# Patient Record
Sex: MALE | Race: BLACK OR AFRICAN AMERICAN
[De-identification: names, ages, dates, MRNs, and addresses within clinical notes are randomized per-mention and may not be internally consistent; named-entity substitution may affect disease eponyms.]

---

## 2024-10-26 ENCOUNTER — HOSPITAL ENCOUNTER (EMERGENCY)
Facility: HOSPITAL | Age: 54
Discharge: ANOTHER HOSPITAL | End: 2024-10-26
Attending: EMERGENCY MEDICINE

## 2024-10-26 ENCOUNTER — APPOINTMENT (OUTPATIENT)
Facility: HOSPITAL | Age: 54
End: 2024-10-26

## 2024-10-26 VITALS
OXYGEN SATURATION: 100 % | WEIGHT: 154.32 LBS | RESPIRATION RATE: 27 BRPM | HEIGHT: 66 IN | SYSTOLIC BLOOD PRESSURE: 129 MMHG | BODY MASS INDEX: 24.8 KG/M2 | DIASTOLIC BLOOD PRESSURE: 76 MMHG | HEART RATE: 102 BPM | TEMPERATURE: 98.7 F

## 2024-10-26 DIAGNOSIS — R41.82 ALTERED MENTAL STATUS, UNSPECIFIED ALTERED MENTAL STATUS TYPE: ICD-10-CM

## 2024-10-26 DIAGNOSIS — G40.901 STATUS EPILEPTICUS (HCC): Primary | ICD-10-CM

## 2024-10-26 LAB
ALBUMIN SERPL-MCNC: 3.3 G/DL (ref 3.4–5)
ALBUMIN/GLOB SERPL: 0.8 (ref 0.8–1.7)
ALP SERPL-CCNC: 67 U/L (ref 45–117)
ALT SERPL-CCNC: 23 U/L (ref 16–61)
AMPHET UR QL SCN: NEGATIVE
ANION GAP BLD CALC-SCNC: ABNORMAL MMOL/L (ref 10–20)
ANION GAP BLD CALC-SCNC: ABNORMAL MMOL/L (ref 10–20)
ANION GAP SERPL CALC-SCNC: 5 MMOL/L (ref 3–18)
ANION GAP SERPL CALC-SCNC: 6 MMOL/L (ref 3–18)
APAP SERPL-MCNC: <2 UG/ML (ref 10–30)
APPEARANCE UR: CLEAR
ARTERIAL PATENCY WRIST A: POSITIVE
AST SERPL-CCNC: 12 U/L (ref 10–38)
BARBITURATES UR QL SCN: NEGATIVE
BASE EXCESS BLD CALC-SCNC: 0.3 MMOL/L
BASE EXCESS BLD CALC-SCNC: 1.2 MMOL/L
BASOPHILS # BLD: 0 K/UL (ref 0–0.1)
BASOPHILS NFR BLD: 0 % (ref 0–2)
BDY SITE: ABNORMAL
BDY SITE: ABNORMAL
BENZODIAZ UR QL: NEGATIVE
BILIRUB SERPL-MCNC: 0.4 MG/DL (ref 0.2–1)
BILIRUB UR QL: NEGATIVE
BODY TEMPERATURE: 98.9
BODY TEMPERATURE: 98.9
BUN SERPL-MCNC: 13 MG/DL (ref 7–18)
BUN SERPL-MCNC: 14 MG/DL (ref 7–18)
BUN/CREAT SERPL: 13 (ref 12–20)
BUN/CREAT SERPL: 14 (ref 12–20)
CA-I BLD-MCNC: 1.16 MMOL/L (ref 1.15–1.33)
CA-I BLD-MCNC: 1.19 MMOL/L (ref 1.15–1.33)
CALCIUM SERPL-MCNC: 8.4 MG/DL (ref 8.5–10.1)
CALCIUM SERPL-MCNC: 8.9 MG/DL (ref 8.5–10.1)
CANNABINOIDS UR QL SCN: NEGATIVE
CHLORIDE BLD-SCNC: 100 MMOL/L (ref 98–107)
CHLORIDE BLD-SCNC: 103 MMOL/L (ref 98–107)
CHLORIDE SERPL-SCNC: 106 MMOL/L (ref 100–111)
CHLORIDE SERPL-SCNC: 107 MMOL/L (ref 100–111)
CO2 BLD-SCNC: 24 MMOL/L (ref 22–29)
CO2 BLD-SCNC: 25 MMOL/L (ref 22–29)
CO2 SERPL-SCNC: 23 MMOL/L (ref 21–32)
CO2 SERPL-SCNC: 25 MMOL/L (ref 21–32)
COCAINE UR QL SCN: NEGATIVE
COLOR UR: YELLOW
CREAT BLD-MCNC: 0.86 MG/DL (ref 0.6–1.3)
CREAT BLD-MCNC: 1.11 MG/DL (ref 0.6–1.3)
CREAT SERPL-MCNC: 0.96 MG/DL (ref 0.6–1.3)
CREAT SERPL-MCNC: 1.12 MG/DL (ref 0.6–1.3)
DIFFERENTIAL METHOD BLD: ABNORMAL
EKG ATRIAL RATE: 110 BPM
EKG DIAGNOSIS: NORMAL
EKG P AXIS: 67 DEGREES
EKG P-R INTERVAL: 168 MS
EKG Q-T INTERVAL: 314 MS
EKG QRS DURATION: 74 MS
EKG QTC CALCULATION (BAZETT): 424 MS
EKG R AXIS: 76 DEGREES
EKG T AXIS: 59 DEGREES
EKG VENTRICULAR RATE: 110 BPM
EOSINOPHIL # BLD: 0 K/UL (ref 0–0.4)
EOSINOPHIL NFR BLD: 0 % (ref 0–5)
ERYTHROCYTE [DISTWIDTH] IN BLOOD BY AUTOMATED COUNT: 13.8 % (ref 11.6–14.5)
ETHANOL SERPL-MCNC: <3 MG/DL (ref 0–3)
FENTANYL: NEGATIVE
FIO2 ON VENT: 100 %
FIO2 ON VENT: 60 %
FLUAV RNA SPEC QL NAA+PROBE: NOT DETECTED
FLUBV RNA SPEC QL NAA+PROBE: NOT DETECTED
GAS FLOW.O2 O2 DELIVERY SYS: ABNORMAL
GAS FLOW.O2 O2 DELIVERY SYS: ABNORMAL
GLOBULIN SER CALC-MCNC: 3.9 G/DL (ref 2–4)
GLUCOSE BLD-MCNC: 113 MG/DL (ref 74–99)
GLUCOSE BLD-MCNC: 97 MG/DL (ref 74–99)
GLUCOSE SERPL-MCNC: 187 MG/DL (ref 74–99)
GLUCOSE SERPL-MCNC: 99 MG/DL (ref 74–99)
GLUCOSE UR STRIP.AUTO-MCNC: 100 MG/DL
HCO3 BLD-SCNC: 24.2 MMOL/L (ref 21–28)
HCO3 BLD-SCNC: 24.7 MMOL/L (ref 21–28)
HCT VFR BLD AUTO: 36.9 % (ref 36–48)
HGB BLD-MCNC: 12.7 G/DL (ref 13–16)
HGB UR QL STRIP: NEGATIVE
IMM GRANULOCYTES # BLD AUTO: 0 K/UL (ref 0–0.04)
IMM GRANULOCYTES NFR BLD AUTO: 0 % (ref 0–0.5)
INSPIRATION.DURATION SETTING TIME VENT: 0.7 SEC
INSPIRATION.DURATION SETTING TIME VENT: 0.9 SEC
IPAP/PIP: 18
IPAP/PIP: 23
KETONES UR QL STRIP.AUTO: NEGATIVE MG/DL
LACTATE BLD-SCNC: 1.38 MMOL/L (ref 0.4–2)
LACTATE BLD-SCNC: 1.61 MMOL/L (ref 0.4–2)
LEUKOCYTE ESTERASE UR QL STRIP.AUTO: NEGATIVE
LITHIUM SERPL-SCNC: <0.2 MMOL/L (ref 0.6–1.2)
LYMPHOCYTES # BLD: 0.5 K/UL (ref 0.9–3.6)
LYMPHOCYTES NFR BLD: 8 % (ref 21–52)
Lab: NORMAL
Lab: NORMAL
MAGNESIUM SERPL-MCNC: 1.7 MG/DL (ref 1.6–2.6)
MCH RBC QN AUTO: 29.5 PG (ref 24–34)
MCHC RBC AUTO-ENTMCNC: 34.4 G/DL (ref 31–37)
MCV RBC AUTO: 85.6 FL (ref 78–100)
METHADONE UR QL: NEGATIVE
MONOCYTES # BLD: 0.8 K/UL (ref 0.05–1.2)
MONOCYTES NFR BLD: 12 % (ref 3–10)
NEUTS SEG # BLD: 5.2 K/UL (ref 1.8–8)
NEUTS SEG NFR BLD: 80 % (ref 40–73)
NITRITE UR QL STRIP.AUTO: NEGATIVE
NRBC # BLD: 0 K/UL (ref 0–0.01)
NRBC BLD-RTO: 0 PER 100 WBC
OPIATES UR QL: NEGATIVE
OXYCODONE UR QL SCN: NEGATIVE
PAW @ MEAN EXP FLOW ON VENT: 8.8 CMH2O
PAW @ MEAN EXP FLOW ON VENT: 9.4 CMH2O
PCO2 BLD: 34.9 MMHG (ref 35–48)
PCO2 BLD: 35.9 MMHG (ref 35–48)
PCP UR QL: NEGATIVE
PEEP RESPIRATORY: 6
PEEP RESPIRATORY: 6
PH BLD: 7.44 (ref 7.35–7.45)
PH BLD: 7.46 (ref 7.35–7.45)
PH UR STRIP: 7 (ref 5–8)
PHOSPHATE SERPL-MCNC: 3.1 MG/DL (ref 2.5–4.9)
PLATELET # BLD AUTO: 229 K/UL (ref 135–420)
PMV BLD AUTO: 9.3 FL (ref 9.2–11.8)
PO2 BLD: 216 MMHG (ref 83–108)
PO2 BLD: 606 MMHG (ref 83–108)
POTASSIUM BLD-SCNC: 3.9 MMOL/L (ref 3.5–5.1)
POTASSIUM BLD-SCNC: 4 MMOL/L (ref 3.5–5.1)
POTASSIUM SERPL-SCNC: 4.2 MMOL/L (ref 3.5–5.5)
POTASSIUM SERPL-SCNC: 4.7 MMOL/L (ref 3.5–5.5)
PROT SERPL-MCNC: 7.2 G/DL (ref 6.4–8.2)
PROT UR STRIP-MCNC: NEGATIVE MG/DL
RBC # BLD AUTO: 4.31 M/UL (ref 4.35–5.65)
RESPIRATORY RATE, POC: 12 (ref 5–40)
RESPIRATORY RATE, POC: 16 (ref 5–40)
SALICYLATES SERPL-MCNC: <1.7 MG/DL (ref 2.8–20)
SAO2 % BLD: 100 % (ref 94–98)
SAO2 % BLD: 100 % (ref 94–98)
SARS-COV-2 RNA RESP QL NAA+PROBE: NOT DETECTED
SERVICE CMNT-IMP: ABNORMAL
SERVICE CMNT-IMP: ABNORMAL
SODIUM BLD-SCNC: 138 MMOL/L (ref 136–145)
SODIUM BLD-SCNC: 140 MMOL/L (ref 136–145)
SODIUM SERPL-SCNC: 136 MMOL/L (ref 136–145)
SODIUM SERPL-SCNC: 136 MMOL/L (ref 136–145)
SOURCE: NORMAL
SP GR UR REFRACTOMETRY: >1.03 (ref 1–1.03)
SPECIMEN SITE: ABNORMAL
SPECIMEN SITE: ABNORMAL
TROPONIN I SERPL HS-MCNC: 8 NG/L (ref 0–78)
UROBILINOGEN UR QL STRIP.AUTO: 1 EU/DL (ref 0.2–1)
VALPROATE SERPL-MCNC: 37 UG/ML (ref 50–100)
VENTILATION MODE VENT: ABNORMAL
VENTILATION MODE VENT: ABNORMAL
VT SETTING VENT: 500
VT SETTING VENT: 520
WBC # BLD AUTO: 6.5 K/UL (ref 4.6–13.2)

## 2024-10-26 PROCEDURE — 2720000010 HC SURG SUPPLY STERILE

## 2024-10-26 PROCEDURE — 2500000003 HC RX 250 WO HCPCS: Performed by: PSYCHIATRY & NEUROLOGY

## 2024-10-26 PROCEDURE — 71045 X-RAY EXAM CHEST 1 VIEW: CPT

## 2024-10-26 PROCEDURE — 82947 ASSAY GLUCOSE BLOOD QUANT: CPT

## 2024-10-26 PROCEDURE — 85025 COMPLETE CBC W/AUTO DIFF WBC: CPT

## 2024-10-26 PROCEDURE — 70498 CT ANGIOGRAPHY NECK: CPT

## 2024-10-26 PROCEDURE — 99291 CRITICAL CARE FIRST HOUR: CPT | Performed by: PSYCHIATRY & NEUROLOGY

## 2024-10-26 PROCEDURE — 84295 ASSAY OF SERUM SODIUM: CPT

## 2024-10-26 PROCEDURE — 84132 ASSAY OF SERUM POTASSIUM: CPT

## 2024-10-26 PROCEDURE — 96365 THER/PROPH/DIAG IV INF INIT: CPT

## 2024-10-26 PROCEDURE — 31500 INSERT EMERGENCY AIRWAY: CPT

## 2024-10-26 PROCEDURE — 99291 CRITICAL CARE FIRST HOUR: CPT

## 2024-10-26 PROCEDURE — 94761 N-INVAS EAR/PLS OXIMETRY MLT: CPT

## 2024-10-26 PROCEDURE — 96375 TX/PRO/DX INJ NEW DRUG ADDON: CPT

## 2024-10-26 PROCEDURE — 84484 ASSAY OF TROPONIN QUANT: CPT

## 2024-10-26 PROCEDURE — 99292 CRITICAL CARE ADDL 30 MIN: CPT | Performed by: PSYCHIATRY & NEUROLOGY

## 2024-10-26 PROCEDURE — 80307 DRUG TEST PRSMV CHEM ANLYZR: CPT

## 2024-10-26 PROCEDURE — 99285 EMERGENCY DEPT VISIT HI MDM: CPT

## 2024-10-26 PROCEDURE — 84100 ASSAY OF PHOSPHORUS: CPT

## 2024-10-26 PROCEDURE — 96360 HYDRATION IV INFUSION INIT: CPT

## 2024-10-26 PROCEDURE — 80164 ASSAY DIPROPYLACETIC ACD TOT: CPT

## 2024-10-26 PROCEDURE — 80178 ASSAY OF LITHIUM: CPT

## 2024-10-26 PROCEDURE — 80179 DRUG ASSAY SALICYLATE: CPT

## 2024-10-26 PROCEDURE — 83605 ASSAY OF LACTIC ACID: CPT

## 2024-10-26 PROCEDURE — 2700000000 HC OXYGEN THERAPY PER DAY

## 2024-10-26 PROCEDURE — 6360000002 HC RX W HCPCS

## 2024-10-26 PROCEDURE — 82077 ASSAY SPEC XCP UR&BREATH IA: CPT

## 2024-10-26 PROCEDURE — 82330 ASSAY OF CALCIUM: CPT

## 2024-10-26 PROCEDURE — 2500000003 HC RX 250 WO HCPCS: Performed by: EMERGENCY MEDICINE

## 2024-10-26 PROCEDURE — 96376 TX/PRO/DX INJ SAME DRUG ADON: CPT

## 2024-10-26 PROCEDURE — 80143 DRUG ASSAY ACETAMINOPHEN: CPT

## 2024-10-26 PROCEDURE — 99292 CRITICAL CARE ADDL 30 MIN: CPT

## 2024-10-26 PROCEDURE — 96361 HYDRATE IV INFUSION ADD-ON: CPT

## 2024-10-26 PROCEDURE — 93010 ELECTROCARDIOGRAM REPORT: CPT | Performed by: INTERNAL MEDICINE

## 2024-10-26 PROCEDURE — 83735 ASSAY OF MAGNESIUM: CPT

## 2024-10-26 PROCEDURE — 85014 HEMATOCRIT: CPT

## 2024-10-26 PROCEDURE — 70450 CT HEAD/BRAIN W/O DYE: CPT

## 2024-10-26 PROCEDURE — 6360000002 HC RX W HCPCS: Performed by: EMERGENCY MEDICINE

## 2024-10-26 PROCEDURE — 87636 SARSCOV2 & INF A&B AMP PRB: CPT

## 2024-10-26 PROCEDURE — 82803 BLOOD GASES ANY COMBINATION: CPT

## 2024-10-26 PROCEDURE — 81003 URINALYSIS AUTO W/O SCOPE: CPT

## 2024-10-26 PROCEDURE — 94002 VENT MGMT INPAT INIT DAY: CPT

## 2024-10-26 PROCEDURE — 80053 COMPREHEN METABOLIC PANEL: CPT

## 2024-10-26 PROCEDURE — 95706 EEG WO VID 2-12HR INTMT MNTR: CPT

## 2024-10-26 PROCEDURE — 36600 WITHDRAWAL OF ARTERIAL BLOOD: CPT

## 2024-10-26 PROCEDURE — 6360000004 HC RX CONTRAST MEDICATION: Performed by: EMERGENCY MEDICINE

## 2024-10-26 PROCEDURE — 93005 ELECTROCARDIOGRAM TRACING: CPT | Performed by: EMERGENCY MEDICINE

## 2024-10-26 PROCEDURE — 2580000003 HC RX 258: Performed by: EMERGENCY MEDICINE

## 2024-10-26 PROCEDURE — 2580000003 HC RX 258: Performed by: PSYCHIATRY & NEUROLOGY

## 2024-10-26 PROCEDURE — 95717 EEG PHYS/QHP 2-12 HR W/O VID: CPT | Performed by: PSYCHIATRY & NEUROLOGY

## 2024-10-26 RX ORDER — ETOMIDATE 2 MG/ML
20 INJECTION INTRAVENOUS ONCE
Status: COMPLETED | OUTPATIENT
Start: 2024-10-26 | End: 2024-10-26

## 2024-10-26 RX ORDER — LEVETIRACETAM 500 MG/1
500 TABLET ORAL 2 TIMES DAILY
COMMUNITY

## 2024-10-26 RX ORDER — LORAZEPAM 2 MG/ML
2 INJECTION INTRAMUSCULAR ONCE
Status: COMPLETED | OUTPATIENT
Start: 2024-10-26 | End: 2024-10-26

## 2024-10-26 RX ORDER — BENZTROPINE MESYLATE 0.5 MG/1
0.5 TABLET ORAL
COMMUNITY

## 2024-10-26 RX ORDER — PROPOFOL 10 MG/ML
INJECTION, EMULSION INTRAVENOUS
Status: COMPLETED
Start: 2024-10-26 | End: 2024-10-26

## 2024-10-26 RX ORDER — LORAZEPAM 2 MG/ML
INJECTION INTRAMUSCULAR
Status: COMPLETED
Start: 2024-10-26 | End: 2024-10-26

## 2024-10-26 RX ORDER — PROPOFOL 10 MG/ML
5-50 INJECTION, EMULSION INTRAVENOUS ONCE
Status: COMPLETED | OUTPATIENT
Start: 2024-10-26 | End: 2024-10-26

## 2024-10-26 RX ORDER — SODIUM CHLORIDE 9 MG/ML
INJECTION, SOLUTION INTRAVENOUS CONTINUOUS
Status: DISCONTINUED | OUTPATIENT
Start: 2024-10-26 | End: 2024-10-26 | Stop reason: HOSPADM

## 2024-10-26 RX ORDER — BICTEGRAVIR SODIUM, EMTRICITABINE, AND TENOFOVIR ALAFENAMIDE FUMARATE 50; 200; 25 MG/1; MG/1; MG/1
1 TABLET ORAL DAILY
COMMUNITY
Start: 2024-09-27

## 2024-10-26 RX ORDER — LEVETIRACETAM 500 MG/5ML
3000 INJECTION, SOLUTION, CONCENTRATE INTRAVENOUS
Status: COMPLETED | OUTPATIENT
Start: 2024-10-26 | End: 2024-10-26

## 2024-10-26 RX ORDER — MIDAZOLAM HYDROCHLORIDE 1 MG/ML
5 INJECTION, SOLUTION INTRAMUSCULAR; INTRAVENOUS ONCE
Status: DISCONTINUED | OUTPATIENT
Start: 2024-10-26 | End: 2024-10-26 | Stop reason: HOSPADM

## 2024-10-26 RX ORDER — HALOPERIDOL 5 MG/1
10 TABLET ORAL DAILY
COMMUNITY
Start: 2024-10-09 | End: 2024-11-08

## 2024-10-26 RX ORDER — PROPOFOL 10 MG/ML
5-50 INJECTION, EMULSION INTRAVENOUS CONTINUOUS
Status: DISCONTINUED | OUTPATIENT
Start: 2024-10-26 | End: 2024-10-26 | Stop reason: HOSPADM

## 2024-10-26 RX ORDER — EMTRICITABINE AND TENOFOVIR DISOPROXIL FUMARATE 200; 300 MG/1; MG/1
1 TABLET, FILM COATED ORAL DAILY
COMMUNITY

## 2024-10-26 RX ORDER — ROCURONIUM BROMIDE 10 MG/ML
80 INJECTION, SOLUTION INTRAVENOUS ONCE
Status: COMPLETED | OUTPATIENT
Start: 2024-10-26 | End: 2024-10-26

## 2024-10-26 RX ORDER — DIVALPROEX SODIUM 500 MG/1
500 TABLET, DELAYED RELEASE ORAL 2 TIMES DAILY
COMMUNITY
Start: 2024-10-09 | End: 2024-11-08

## 2024-10-26 RX ORDER — IOPAMIDOL 755 MG/ML
85 INJECTION, SOLUTION INTRAVASCULAR
Status: COMPLETED | OUTPATIENT
Start: 2024-10-26 | End: 2024-10-26

## 2024-10-26 RX ORDER — LEVETIRACETAM 500 MG/5ML
1500 INJECTION, SOLUTION, CONCENTRATE INTRAVENOUS
Status: DISCONTINUED | OUTPATIENT
Start: 2024-10-26 | End: 2024-10-26

## 2024-10-26 RX ORDER — 0.9 % SODIUM CHLORIDE 0.9 %
1000 INTRAVENOUS SOLUTION INTRAVENOUS ONCE
Status: COMPLETED | OUTPATIENT
Start: 2024-10-26 | End: 2024-10-26

## 2024-10-26 RX ADMIN — PROPOFOL 20 MCG/KG/MIN: 10 INJECTION, EMULSION INTRAVENOUS at 15:50

## 2024-10-26 RX ADMIN — LEVETIRACETAM 3000 MG: 500 INJECTION, SOLUTION, CONCENTRATE INTRAVENOUS at 14:44

## 2024-10-26 RX ADMIN — ROCURONIUM BROMIDE 80 MG: 10 INJECTION, SOLUTION INTRAVENOUS at 14:51

## 2024-10-26 RX ADMIN — LORAZEPAM 2 MG: 2 INJECTION INTRAMUSCULAR at 14:10

## 2024-10-26 RX ADMIN — PROPOFOL 20 MCG/KG/MIN: 10 INJECTION, EMULSION INTRAVENOUS at 15:00

## 2024-10-26 RX ADMIN — SODIUM CHLORIDE 1000 ML: 9 INJECTION, SOLUTION INTRAVENOUS at 15:48

## 2024-10-26 RX ADMIN — ETOMIDATE INJECTION 20 MG: 2 SOLUTION INTRAVENOUS at 14:51

## 2024-10-26 RX ADMIN — LORAZEPAM 2 MG: 2 INJECTION INTRAMUSCULAR; INTRAVENOUS at 14:35

## 2024-10-26 RX ADMIN — SODIUM CHLORIDE: 9 INJECTION, SOLUTION INTRAVENOUS at 15:51

## 2024-10-26 RX ADMIN — VALPROATE SODIUM 2000 MG: 100 INJECTION, SOLUTION INTRAVENOUS at 17:38

## 2024-10-26 RX ADMIN — PROPOFOL 50 MCG/KG/MIN: 10 INJECTION, EMULSION INTRAVENOUS at 19:56

## 2024-10-26 RX ADMIN — SODIUM CHLORIDE: 9 INJECTION, SOLUTION INTRAVENOUS at 15:48

## 2024-10-26 RX ADMIN — LORAZEPAM 2 MG: 2 INJECTION INTRAMUSCULAR; INTRAVENOUS at 14:10

## 2024-10-26 RX ADMIN — LORAZEPAM 2 MG: 2 INJECTION INTRAMUSCULAR; INTRAVENOUS at 14:30

## 2024-10-26 RX ADMIN — IOPAMIDOL 85 ML: 755 INJECTION, SOLUTION INTRAVENOUS at 16:52

## 2024-10-26 ASSESSMENT — ENCOUNTER SYMPTOMS
EYES NEGATIVE: 1
ABDOMINAL PAIN: 0
CHEST TIGHTNESS: 0

## 2024-10-26 ASSESSMENT — PAIN - FUNCTIONAL ASSESSMENT: PAIN_FUNCTIONAL_ASSESSMENT: NONE - DENIES PAIN

## 2024-10-26 ASSESSMENT — PULMONARY FUNCTION TESTS
PIF_VALUE: 22
PIF_VALUE: 18
PIF_VALUE: 20
PIF_VALUE: 18

## 2024-10-26 NOTE — PROGRESS NOTES
Titrate FIO2 50%/ABG results.   10/26/24 1751   Breath Sounds   Respiratory Pattern Regular   Breath Sounds Bilateral Clear   Vent Information   Ventilator Day(s) 1   Vent Mode AC/PRVC   Ventilator Settings   FiO2  50 %   Insp Time (sec) 0.7 sec   Resp Rate (Set) 12 bpm   Target Vt 500   PEEP/CPAP (cmH2O) 6   Vent Patient Data (Readings)   Vt (Measured) 531 mL   Peak Inspiratory Pressure (cmH2O) 22 cmH2O   Rate Measured 12 br/min   Minute Volume (L/min) 6.23 Liters   Mean Airway Pressure (cmH2O) 8.8 cmH20   Plateau Pressure (cm H2O) 16 cm H2O   Driving Pressure 10   I:E Ratio 1:6.1   Static Compliance (L/cm H2O) 55   Insp Rise Time (%) 50 %   Vent Alarm Settings   High Pressure (cmH2O) 40 cmH2O   Low Minute Volume (lpm) 2 L/min   High Minute Volume (lpm) 20 L/min   Low Exhaled Vt (ml) 200 mL   RR High (bpm) 40 br/min   Apnea (secs) 20 secs   Additional Respiratoray Assessments   Humidification Source Heated wire   Humidification Temp 37   Ambu Bag With Mask At Bedside Yes   Airway Clearance   Suction ET Tube   Suction Device Inline suction catheter   Sputum Method Obtained Endotracheal   Sputum Amount Moderate   Sputum Color/Odor Clear;Tan   Sputum Consistency Thick;Thin   ETT    Placement Date/Time: 10/26/24 1452   Present on Admission/Arrival: No  Placed By: In ED;Licensed provider  Placement Verified By: Auscultation;Colorimetric ETCO2 device;Direct visualization  Preoxygenation: Yes  Mask Ventilation: Ventilated by mask (1...   Secured At 26 cm   Measured From Lips   ETT Placement Left   Secured By Commercial tube gamez   Site Assessment Dry

## 2024-10-26 NOTE — VIRTUAL HEALTH
LCSW attempted to meet with pt.  Pt was eating food, refused to look at camera, and laid down to cover himself with blanket.   Pt would close his eyes, and look over at camera.   LCSW attempted for several minutes to get patients attention.  RN attempted to get pt to speak to LCSW.   Pt eat food, did not make eye contact, laid back in his bed, and did not make eye contact.     RN reported that she would express refusal to ED provider.     Reason for Cancel: TelePsych Reason for Cancel: Patient refused       Electronically signed by Grant Luna LCSW on 10/26/2024 at 12:54 PM.     Keith Samson, was evaluated through a synchronous (real-time) audio-video encounter. The patient (and/or guardian if applicable) is aware that this is a billable service, which includes applicable co-pays. This virtual visit was conducted with patient's (and/or legal guardian's) consent. Patient identification was verified, and a caregiver was present when appropriate.  The patient was located at Facility (Appt Department): [facility] EMERGENCY DEPT  [address] [address]  Loc: [phone]  The provider was located at Home (City/State): [place], [place]  Confirm you are appropriately licensed, registered, or certified to deliver care in the state where the patient is located as indicated above. If you are not or unsure, please re-schedule the visit: No, this visit will be rescheduled.   [place] Consult to Tele-Psych  Consult performed by: Grant Luna LCSW  Consult ordered by: Contreras Jacinto MD         Total time spent on this encounter: Not billed by time    --Grant Luna LCSW on 10/26/2024 at 12:53 PM    An electronic signature was used to authenticate this note.

## 2024-10-26 NOTE — ED NOTES
Assumed care of pt after pt exhibited seizure-like activity, persistent despite 3 doses of 2mg ativan IV and 3g of keppra IV.     Pt intubated for airway protection. Received etomidate 20mg and viridiana 80mg IV at 1451, pt successfully intubated at 1452 with ETT 26 at the teeth.

## 2024-10-26 NOTE — ED NOTES
Pt has been spoken to multiple times, by multiple staff members, instructed to participate in telepsych interview. Patient is either unable to or unwilling to cooperate or partictipate in his care. Patient refuses to respond to staff members, safety partner stated that patient 'wakes up' when there is mention of food but flat affect observed otherwise.      is bedside and updated on the patient's behavior. She stated that the patient is in a group home and in charge of his own medication. Patient has an upcoming appointment with his mental health provider per the .

## 2024-10-26 NOTE — VIRTUAL HEALTH
Consults     Reason for Cancel: TelePsych Reason for Cancel: Patient unable to participate      Called and spoke with Dr. Jacinto regarding patient.  During conversation, Dr. Jacinto had to urgently go to patient's bedside and I was informed that the patient had a seizure.  The consult order will be cancelled as ED will be attending to patient's medical needs.  Please re-consult, when appropriate, so TelePsych team can evaluate patient.  Thank you.        --Octavio Malloy, ADIS - CNP on 10/26/2024 at 2:20 PM    An electronic signature was used to authenticate this note.

## 2024-10-26 NOTE — PROGRESS NOTES
Preliminary interpretation CTA head neck.    No large vessel occlusion.    Study mildly motion degraded.    Full report to follow.

## 2024-10-26 NOTE — PROGRESS NOTES
Intubated and initiated Mechanical for airway protection, unresponsiveness and continuing seizures.   10/26/24 1515   Breath Sounds   Respiratory Pattern Regular   Breath Sounds Bilateral Clear   Vent Information   Ventilator Day(s) 1   Ventilator Initiate Yes   Vent Mode AC/PRVC   Ventilator Settings   FiO2  100 %   Insp Time (sec) 0.9 sec   Resp Rate (Set) 16 bpm   Target Vt 520   PEEP/CPAP (cmH2O) 6   Vent Patient Data (Readings)   Vt (Measured) 480 mL   Peak Inspiratory Pressure (cmH2O) 18 cmH2O   Rate Measured 16 br/min   Minute Volume (L/min) 7.77 Liters   Mean Airway Pressure (cmH2O) 9.4 cmH20   Plateau Pressure (cm H2O) 17 cm H2O   Driving Pressure 11   I:E Ratio 1:3.2   Static Compliance (L/cm H2O) 72   Insp Rise Time (%) 50 %   Backup Apnea On   Backup Rate 16 Breaths Per Minute   Backup Vt 520   Backup I Time 1   Vent Alarm Settings   High Pressure (cmH2O) 40 cmH2O   Low Minute Volume (lpm) 2 L/min   High Minute Volume (lpm) 20 L/min   Low Exhaled Vt (ml) 200 mL   RR High (bpm) 40 br/min   Apnea (secs) 20 secs   Additional Respiratoray Assessments   Humidification Source Heated wire   Humidification Temp 37   Ambu Bag With Mask At Bedside Yes   ETT    Placement Date/Time: 10/26/24 1452   Present on Admission/Arrival: No  Placed By: In ED;Licensed provider  Placement Verified By: Auscultation;Colorimetric ETCO2 device;Direct visualization  Preoxygenation: Yes  Mask Ventilation: Ventilated by mask (1...   $ Intubation Emergent  $ Yes   Secured At 26 cm   Measured From Lips   ETT Placement Center   Secured By Commercial tube gamez   Site Assessment Dry

## 2024-10-26 NOTE — VIRTUAL HEALTH
[place] Consult to Tele-Psych  Consult performed by: Rosalina Pineda APRN - CNP  Consult ordered by: Contreras Jacinto MD  Reason for consult: Homicidal/Risk to Others      Reason for Cancel: TelePsych Reason for Cancel: Patient refused    Patient is refusing to participate in consult at this time. ED staff in room made multiple attempts to encourage patient to participate, however, patient refused to answer any questions. Spoke to Dr. Jacinto and advised consult will be cancelled at this time and to place new order for Telepsych consult when patient is alert and agreeable to participate in assessment.          Keith Samson, was evaluated through a synchronous (real-time) audio-video encounter. The patient (and/or guardian if applicable) is aware that this is a billable service, which includes applicable co-pays. This virtual visit was conducted with patient's (and/or legal guardian's) consent. Patient identification was verified, and a caregiver was present when appropriate.  The patient was located at Facility (Appt Department): [facility] EMERGENCY DEPT  [address] [address]  Loc: [phone]  The provider was located at Home (City/State): [place]  Confirm you are appropriately licensed, registered, or certified to deliver care in the state where the patient is located as indicated above. If you are not or unsure, please re-schedule the visit: Yes, I confirm.   [place] Consult to Tele-Psych  Consult performed by: Rosalina Pineda APRN - CNP  Consult ordered by: Contreras Jacinto MD  Reason for consult: Homicidal/Risk to Others      Total time spent on this encounter: Not billed by time    --ADIS Sprague CNP on 10/26/2024 at 11:12 AM    An electronic signature was used to authenticate this note.

## 2024-10-26 NOTE — PROGRESS NOTES
Titrate FIO2 60%, RR 12/ABG results...   10/26/24 1536   Patient Observation   SpO2 100 %   Breath Sounds   Respiratory Pattern Regular   Breath Sounds Bilateral Clear   Vent Information   Ventilator Day(s) 1   Vent Mode AC/PRVC   Ventilator Settings   FiO2  60 %   Insp Time (sec) 0.9 sec   Resp Rate (Set) 12 bpm   Target Vt 500   PEEP/CPAP (cmH2O) 6   Vent Patient Data (Readings)   Vt (Measured) 504 mL   Peak Inspiratory Pressure (cmH2O) 18 cmH2O   Rate Measured 14 br/min   Minute Volume (L/min) 6.94 Liters   Mean Airway Pressure (cmH2O) 10 cmH20   I:E Ratio 1:4.2

## 2024-10-26 NOTE — ED NOTES
Assumed care of PT. Cardiac monitor in place.  Pt intubated. Medications running as per MAR and verified.

## 2024-10-26 NOTE — CONSULTS
[facility]  Department of Neurology  Chief Complaint   Patient presents with    Suicidal       HPI:   54 M h/o HIV was sent to hospital as he was found down at floor. He stated that he abused drug, as he had suicidal ideation. After he was sent to ER, patient suddenly had multiple episodes of seizure characteristic of unresponsiveness, whole body jerking, eyes rolling back, and deviation to rightward. There was right arm/elbow stiff extension These episodes happened without full recovery. He was intubated for airway protection, and loaded with propofol    No past medical history on file.    No past surgical history on file.    Current Facility-Administered Medications   Medication Dose Route Frequency Provider Last Rate Last Admin    sodium chloride 0.9 % bolus 1,000 mL  1,000 mL IntraVENous Once Contreras Jacinto .6 mL/hr at 10/26/24 1548 1,000 mL at 10/26/24 1548    0.9 % sodium chloride infusion   IntraVENous Continuous Contreras Jacinto  mL/hr at 10/26/24 1556 Rate Verify at 10/26/24 1556    propofol infusion  5-50 mcg/kg/min (Order-Specific) IntraVENous Continuous Contreras Jacinto MD        rocuronium (ZEMURON) injection 80 mg  80 mg IntraVENous Once Contreras Jacinto MD         No current outpatient medications on file.        Allergies   Allergen Reactions    Penicillins             No family history on file.    Review of Systems:  unobtainable    Physical Examination:  BP (!) 132/119   Pulse (!) 115   Temp 98.9 °F (37.2 °C) (Axillary)   Resp 23   Ht 1.676 m (5' 6\")   Wt 70 kg (154 lb 5.2 oz)   SpO2 100%   BMI 24.91 kg/m²     GENERAL APPEARANCE:  The patient's intubated and sedated with propofol.  EYES:  Anicteric.   NECK:  Supple.  CARDIOVASCULAR:  Pulses are present bilaterally.  LUNGS:  No respiratory distress.    EXTREMITIES: No edema.    NEUROLOGICAL EXAM:  Mental status:  The patient does not response to verbal commands      Cranial nerves:     2:  Pupils  periventricular small vessel disease change..    Total neurocritical time 75 minutes taking care of patient in following activities:  1-Preparing to see the patient (review of tests, prior medical visits)  2-Obtaining and/or reviewing separately medically appropriate obtained history  3-Performing the medically appropriate exam and/or evaluation  4-Clinical documentation in the EHR or other health record  5-Counseling and educating the patient or caregiver  6-Interpreting results and/or communicating results to the patient/family/caregiver  7-Care coordination  8-Ordering medications, tests, or procedures  9-Referring and communicating with other health care professionals  10-Documentation in EHR.     Signed By: Marivel Keller MD. PhD, MS, FAASM

## 2024-10-26 NOTE — ED TRIAGE NOTES
Pt brought in from home. Pt shaking and stated her used drugs tonight but did not state exactly what drugs he used. Pt not answerig questions. Pt had feces on clothes and shoes. Removed clothes, placed in blue scrubs.

## 2024-10-27 PROBLEM — G40.901 STATUS EPILEPTICUS (HCC): Status: ACTIVE | Noted: 2024-10-27

## 2024-10-27 NOTE — PROGRESS NOTES
55 Y/o male full code allergic: penicillin  Arrived 10/26/24 c/o homicidal ideation per EMS    History: schizoaffective disorder, HIV , smokes mariajuana, cocaine and cigarette

## 2024-10-27 NOTE — PROCEDURES
PROCEDURE NOTE  Date: 10/27/2024   Name: Keith Samson  YOB: 1970  Date of service: 10/26/2024  ProceduresEEG Procedure Note  Indications: Seizure    Medications:   No current facility-administered medications for this encounter.     Current Outpatient Medications   Medication Sig Dispense Refill    bictegravir-emtricitab-tenofovir alafenamide (BIKTARVY) -25 MG TABS per tablet Take 1 tablet by mouth daily      divalproex (DEPAKOTE) 500 MG DR tablet Take 1 tablet by mouth 2 times daily      haloperidol (HALDOL) 5 MG tablet Take 2 tablets by mouth daily      benztropine (COGENTIN) 0.5 MG tablet Take 1 tablet by mouth      emtricitabine-tenofovir (TRUVADA) 200-300 MG per tablet Take 1 tablet by mouth daily      raltegravir (ISENTRESS) 400 MG tablet Take 1 tablet by mouth 2 times daily      levETIRAcetam (KEPPRA) 500 MG tablet Take 1 tablet by mouth 2 times daily         Procedure  This is a 2+ hour ceribell    EEG Findings  In the 2+ hour ceribell, the background rhythm is asymmetric, slower at left side.   There are intermittent sporadic epileptiform discharges like sharps seen in the left frontal-temporal areas  No electrographic seizure, or subclinical status epilepticus are found.     Clarity Pro  Seizure burden: 13%    EEG Interpretation/Conclusions  This EEG is abnormal due to lateralized sporadic epileptiform discharges and slowing seen in the left frontal-temporal areas. It is consistent with left frontal-temporal cerebral disturbance  Anticonvulsants are recommended. Transferring to tertiary center with CEEG monitoring is suggested based upon clinical history.

## 2024-10-27 NOTE — ED PROVIDER NOTES
I received the patient in turnover from Dr. Sinha at the end of his shift.  He was brought in from home stating that he use drugs but would not answer any more questions.  Initially he was thought to be having a mental health emergency.  However, he began seizing.  He received IV Keppra, multiple doses of Ativan, IV Depacon, now he is intubated on propofol.  His initial cerebral showed a seizure burden of approximately 20%.  Now he is down to 0%.  He was evaluated by Dr. Keller from neurology.  He recommended transfer to a facility with continuous EEG monitoring.  Dr. Jacinto spoke with Dr. Simpson from neurology.  They are willing to accept the patient for continuous EEG monitoring.      I spoke with Dr. Marisa Oh from the [facility] ICU.  She has accepted the patient in transfer.     Faye Hood MD  10/26/24 2028    
escalate the consult to the nurse practitioner. Contreras Jacinto, DO 7:03 PM    RN called me to the room as the patient was having a seizure.  When he walked in the patient was no longer shaking but according to reports he was having shaking all over.  He briefly had extended right arm and Ativan was given as well as a loading dose of Keppra.  Will proceed with a stat CT of the head and placed the rapid EEG device. Contreras Jacinto, DO 7:03 PM    The patient had a total of 6 mg Ativan and had the rapid EEG placed.  Patient had episodes of shaking in the rapid EEG was given a 20% likely seizure burden.  After observation for 15 minutes and a load of Keppra with weight-based benzos patient was intubated.  Given the patient had some right-sided findings on his exam will add a CT angiogram of the head as he goes in to get the CT of the head.  Will discuss the case with neurology as well. Contreras Jacinto, DO 7:03 PM    I discussed case with the neurologist on-call and will come see the patient.  Awaiting CT imaging. Contreras Jacinto, DO 7:03 PM      After confirmed identity patient's sister called and was updated regarding patient status.  Patient is being seen by our neurologist on-call and has evaluated EEG monitoring. Contreras Jacinto, DO 7:03 PM    The patient is now having 0% seizure burden and was seen by the neurologist.  The patient had a CTA and CT of the head that were reassuring.  Patient was seen by Dr. Keller and recommended the patient get transferred for continuous EEG monitoring as we cannot get a formal EEG now for the patient.  He did however mention that I could presented to the ICU if they felt comfortable they could potentially accept the patient.  I discussed this with the ICU and the neurologist all felt that will be best to transfer the patient for continuous EEG monitoring.  Will look for transfer destinations for the patient.      The case was presented to Dr. Simpson at [facility] neurology